# Patient Record
Sex: FEMALE | Race: BLACK OR AFRICAN AMERICAN | URBAN - METROPOLITAN AREA
[De-identification: names, ages, dates, MRNs, and addresses within clinical notes are randomized per-mention and may not be internally consistent; named-entity substitution may affect disease eponyms.]

---

## 2021-09-21 ENCOUNTER — LAB OUTSIDE AN ENCOUNTER (OUTPATIENT)
Dept: URBAN - METROPOLITAN AREA CLINIC 105 | Facility: CLINIC | Age: 46
End: 2021-09-21

## 2021-09-21 ENCOUNTER — WEB ENCOUNTER (OUTPATIENT)
Dept: URBAN - METROPOLITAN AREA CLINIC 17 | Facility: CLINIC | Age: 46
End: 2021-09-21

## 2021-09-21 ENCOUNTER — OFFICE VISIT (OUTPATIENT)
Dept: URBAN - METROPOLITAN AREA CLINIC 17 | Facility: CLINIC | Age: 46
End: 2021-09-21
Payer: COMMERCIAL

## 2021-09-21 DIAGNOSIS — R19.4 CHANGE IN BOWEL HABITS: ICD-10-CM

## 2021-09-21 DIAGNOSIS — Z87.19 HISTORY OF DIVERTICULITIS: ICD-10-CM

## 2021-09-21 DIAGNOSIS — I10 ESSENTIAL HYPERTENSION: ICD-10-CM

## 2021-09-21 DIAGNOSIS — E66.01 MORBID OBESITY: ICD-10-CM

## 2021-09-21 DIAGNOSIS — R10.32 LLQ PAIN: ICD-10-CM

## 2021-09-21 DIAGNOSIS — E13.9 DIABETES 1.5, MANAGED AS TYPE 2: ICD-10-CM

## 2021-09-21 DIAGNOSIS — Z90.49 HISTORY OF RESECTION OF LIVER: ICD-10-CM

## 2021-09-21 DIAGNOSIS — R74.8 ELEVATED LIVER ENZYMES: ICD-10-CM

## 2021-09-21 PROCEDURE — 99204 OFFICE O/P NEW MOD 45 MIN: CPT | Performed by: INTERNAL MEDICINE

## 2021-09-21 RX ORDER — LOSARTAN POTASSIUM 100 MG/1
TABLET, FILM COATED ORAL
Qty: 90 | Status: ACTIVE | COMMUNITY

## 2021-09-21 RX ORDER — SALICYLIC ACID 3 G/100G
1 TABLET LOTION TOPICAL ONCE A DAY
Status: ACTIVE | COMMUNITY

## 2021-09-21 RX ORDER — CARVEDILOL 6.25 MG/1
TABLET, FILM COATED ORAL
Qty: 90 | Status: ACTIVE | COMMUNITY

## 2021-09-21 RX ORDER — EMPAGLIFLOZIN, METFORMIN HYDROCHLORIDE 10; 1000 MG/1; MG/1
TABLET, EXTENDED RELEASE ORAL
Qty: 90 | Status: ACTIVE | COMMUNITY

## 2021-09-21 RX ORDER — SPIRONOLACTONE 50 MG/1
TABLET ORAL
Qty: 90 | Status: ACTIVE | COMMUNITY

## 2021-09-21 RX ORDER — MELOXICAM 7.5 MG/1
TABLET ORAL
Qty: 90 | Status: ACTIVE | COMMUNITY

## 2021-09-21 NOTE — HPI-TODAY'S VISIT:
9/21/21 45 yo pt of Dr Rodriguez who is referred for diverticulitis. She was seen at [facility] about 2 weeks ago for generalized abd pain and tenderness on LLQ.  She had labs and a CT (no records at this time) and told she had diverticulitis. She was given abx and a probiotic. Augmentin. She has completed abx therapy.  She says it helped her feel better and the bloating has resolved.  She was also given a pain med which she took, oxycodone, last taken about a week ago. Says pain recurred about 2 days ago, a full ache centered in her LLQ with intermittent sharp exacerbations. She is afraid that she is "flaring up again".  She was on a liquid diet until  about 11 days ago because she was feeling better she has liberalized her diet.  she has no fever or chills.  Says her BM are changing, she is feeling more constipated than she had been in the past.  On abx her stools were liquid but today formed and difficult to evacuate.  No blood in stool. no fhx of colon CA.

## 2021-09-21 NOTE — EXAM-PHYSICAL EXAM
Gen: Alert, oriented, in no distress Heent: no icterus, lips wnl Lungs: bilateral breath sounds CVS: first and second heart sounds Abdomen: full, soft, dense midline scar extending to R of abdomen and periumbilical scar. benign Ext: no edema Joints: normal joints and symmetry Spine: consistent with age Skin: no rash on exposed areas Neuro: no focal localizing signs

## 2021-09-23 ENCOUNTER — WEB ENCOUNTER (OUTPATIENT)
Dept: URBAN - METROPOLITAN AREA CLINIC 17 | Facility: CLINIC | Age: 46
End: 2021-09-23

## 2021-09-24 ENCOUNTER — WEB ENCOUNTER (OUTPATIENT)
Dept: URBAN - METROPOLITAN AREA CLINIC 17 | Facility: CLINIC | Age: 46
End: 2021-09-24

## 2021-09-24 ENCOUNTER — TELEPHONE ENCOUNTER (OUTPATIENT)
Dept: URBAN - METROPOLITAN AREA CLINIC 92 | Facility: CLINIC | Age: 46
End: 2021-09-24

## 2021-09-24 LAB
ACTIN (SMOOTH MUSCLE) ANTIBODY: 15
ALPHA-1-ANTITRYPSIN, SERUM: 119
BASO (ABSOLUTE): 0.1
BASOS: 1
CERULOPLASMIN: 25.2
EOS (ABSOLUTE): 0.1
EOS: 1
FERRITIN, SERUM: 264
HBSAG SCREEN: NEGATIVE
HEMATOCRIT: 46.7
HEMATOLOGY COMMENTS:: (no result)
HEMOGLOBIN: 15.1
HEP A AB, IGM: NEGATIVE
HEP B CORE AB, IGM: NEGATIVE
HEP C VIRUS AB: 0.1
IMMATURE CELLS: (no result)
IMMATURE GRANS (ABS): 0
IMMATURE GRANULOCYTES: 0
IRON BIND.CAP.(TIBC): 304
IRON SATURATION: 50
IRON: 153
LYMPHS (ABSOLUTE): 2
LYMPHS: 39
MCH: 28.8
MCHC: 32.3
MCV: 89
MITOCHONDRIAL (M2) ANTIBODY: <20
MONOCYTES(ABSOLUTE): 0.4
MONOCYTES: 8
NEUTROPHILS (ABSOLUTE): 2.5
NEUTROPHILS: 51
NRBC: (no result)
PLATELETS: 357
RBC: 5.24
RDW: 13.5
UIBC: 151
WBC: 5

## 2021-09-24 RX ORDER — CIPROFLOXACIN HYDROCHLORIDE 500 MG/1
1 TABLET TABLET, FILM COATED ORAL
Qty: 28 TABLET | Refills: 0 | OUTPATIENT
Start: 2021-09-24 | End: 2021-10-08

## 2021-09-24 RX ORDER — METRONIDAZOLE 500 MG/1
1 TABLET TABLET, FILM COATED ORAL
Qty: 42 TABLET | Refills: 0 | OUTPATIENT
Start: 2021-09-24 | End: 2021-10-08

## 2021-10-06 ENCOUNTER — TELEPHONE ENCOUNTER (OUTPATIENT)
Dept: URBAN - METROPOLITAN AREA CLINIC 17 | Facility: CLINIC | Age: 46
End: 2021-10-06

## 2021-10-06 ENCOUNTER — LAB OUTSIDE AN ENCOUNTER (OUTPATIENT)
Dept: URBAN - METROPOLITAN AREA CLINIC 17 | Facility: CLINIC | Age: 46
End: 2021-10-06

## 2021-10-07 ENCOUNTER — OFFICE VISIT (OUTPATIENT)
Dept: URBAN - METROPOLITAN AREA CLINIC 16 | Facility: CLINIC | Age: 46
End: 2021-10-07
Payer: COMMERCIAL

## 2021-10-07 ENCOUNTER — OFFICE VISIT (OUTPATIENT)
Dept: URBAN - METROPOLITAN AREA CLINIC 17 | Facility: CLINIC | Age: 46
End: 2021-10-07
Payer: COMMERCIAL

## 2021-10-07 VITALS
TEMPERATURE: 97.3 F | DIASTOLIC BLOOD PRESSURE: 78 MMHG | WEIGHT: 269.6 LBS | BODY MASS INDEX: 40.86 KG/M2 | SYSTOLIC BLOOD PRESSURE: 110 MMHG | HEART RATE: 80 BPM | HEIGHT: 68 IN

## 2021-10-07 DIAGNOSIS — R19.4 CHANGE IN BOWEL HABITS: ICD-10-CM

## 2021-10-07 DIAGNOSIS — K76.0 FATTY LIVER: ICD-10-CM

## 2021-10-07 DIAGNOSIS — R10.32 LLQ PAIN: ICD-10-CM

## 2021-10-07 DIAGNOSIS — R74.8 ELEVATED LIVER ENZYMES: ICD-10-CM

## 2021-10-07 DIAGNOSIS — Z87.19 HISTORY OF DIVERTICULITIS: ICD-10-CM

## 2021-10-07 DIAGNOSIS — R79.89 ELEVATED FERRITIN LEVEL: ICD-10-CM

## 2021-10-07 DIAGNOSIS — Z90.49 HISTORY OF RESECTION OF LIVER: ICD-10-CM

## 2021-10-07 DIAGNOSIS — D27.9 DERMOID CYST OF OVARY, UNSPECIFIED LATERALITY: ICD-10-CM

## 2021-10-07 DIAGNOSIS — I10 ESSENTIAL HYPERTENSION: ICD-10-CM

## 2021-10-07 DIAGNOSIS — E66.01 MORBID OBESITY: ICD-10-CM

## 2021-10-07 DIAGNOSIS — E13.9 DIABETES 1.5, MANAGED AS TYPE 2: ICD-10-CM

## 2021-10-07 DIAGNOSIS — K76.0 FATTY (CHANGE OF) LIVER: ICD-10-CM

## 2021-10-07 PROCEDURE — 76705 ECHO EXAM OF ABDOMEN: CPT | Performed by: INTERNAL MEDICINE

## 2021-10-07 PROCEDURE — 99214 OFFICE O/P EST MOD 30 MIN: CPT | Performed by: INTERNAL MEDICINE

## 2021-10-07 RX ORDER — METRONIDAZOLE 500 MG/1
1 TABLET TABLET, FILM COATED ORAL
Qty: 42 TABLET | Refills: 0 | Status: ACTIVE | COMMUNITY
Start: 2021-09-24 | End: 2021-10-08

## 2021-10-07 RX ORDER — LOSARTAN POTASSIUM 100 MG/1
TABLET, FILM COATED ORAL
Qty: 90 | Status: ACTIVE | COMMUNITY

## 2021-10-07 RX ORDER — MELOXICAM 7.5 MG/1
TABLET ORAL
Qty: 90 | Status: ACTIVE | COMMUNITY

## 2021-10-07 RX ORDER — CARVEDILOL 6.25 MG/1
TABLET, FILM COATED ORAL
Qty: 90 | Status: ACTIVE | COMMUNITY

## 2021-10-07 RX ORDER — SALICYLIC ACID 3 G/100G
1 TABLET LOTION TOPICAL ONCE A DAY
Status: ACTIVE | COMMUNITY

## 2021-10-07 RX ORDER — CIPROFLOXACIN HYDROCHLORIDE 500 MG/1
1 TABLET TABLET, FILM COATED ORAL
Qty: 28 TABLET | Refills: 0 | Status: ACTIVE | COMMUNITY
Start: 2021-09-24 | End: 2021-10-08

## 2021-10-07 RX ORDER — SPIRONOLACTONE 50 MG/1
TABLET ORAL
Qty: 90 | Status: ACTIVE | COMMUNITY

## 2021-10-07 RX ORDER — EMPAGLIFLOZIN, METFORMIN HYDROCHLORIDE 10; 1000 MG/1; MG/1
TABLET, EXTENDED RELEASE ORAL
Qty: 90 | Status: ACTIVE | COMMUNITY

## 2021-10-07 NOTE — HPI-TODAY'S VISIT:
9/21/21 47 yo pt of Dr Rodriguez who is referred for diverticulitis. She was seen at [facility] about 2 weeks ago for generalized abd pain and tenderness on LLQ.  She had labs and a CT (no records at this time) and told she had diverticulitis. She was given abx and a probiotic. Augmentin. She has completed abx therapy.  She says it helped her feel better and the bloating has resolved.  She was also given a pain med which she took, oxycodone, last taken about a week ago. Says pain recurred about 2 days ago, a full ache centered in her LLQ with intermittent sharp exacerbations. She is afraid that she is "flaring up again".  She was on a liquid diet until  about 11 days ago because she was feeling better she has liberalized her diet.  she has no fever or chills.  Says her BM are changing, she is feeling more constipated than she had been in the past.  On abx her stools were liquid but today formed and difficult to evacuate.  No blood in stool. no fhx of colon CA.   10/7/21 Here for f.u Taking abx, says she feels better. PCP called in nystatin for oral thrush. Feels fullness in LLQ but its not painful 'this is a day I will prob have liquids because I am afraid it will make me hurt". She was on a liquid diet for one week since I last saw her. While on a liquid diet, she felt no pain until 3 days  in, She called on 9/24 and I sent cipro/flaglakshmi. Pain resolved. She has one more day of abx, She is taking miralax everyday and having soft/runny stools daily because of this.  Has no pain but feels a fullness in her LLQ. no fevers or chills. It is constant.  One week after liquid diet, she started to eat a low fiber diet.

## 2021-10-13 LAB — HEREDITARY  HEMOCHROMATOSIS: (no result)

## 2021-11-04 ENCOUNTER — LAB OUTSIDE AN ENCOUNTER (OUTPATIENT)
Dept: URBAN - METROPOLITAN AREA CLINIC 17 | Facility: CLINIC | Age: 46
End: 2021-11-04

## 2021-11-04 ENCOUNTER — OFFICE VISIT (OUTPATIENT)
Dept: URBAN - METROPOLITAN AREA CLINIC 17 | Facility: CLINIC | Age: 46
End: 2021-11-04
Payer: COMMERCIAL

## 2021-11-04 VITALS
WEIGHT: 262 LBS | HEIGHT: 68 IN | DIASTOLIC BLOOD PRESSURE: 75 MMHG | HEART RATE: 80 BPM | BODY MASS INDEX: 39.71 KG/M2 | SYSTOLIC BLOOD PRESSURE: 113 MMHG | TEMPERATURE: 97.3 F

## 2021-11-04 DIAGNOSIS — R19.4 CHANGE IN BOWEL HABITS: ICD-10-CM

## 2021-11-04 DIAGNOSIS — Z87.19 HISTORY OF DIVERTICULITIS: ICD-10-CM

## 2021-11-04 DIAGNOSIS — R79.89 ELEVATED FERRITIN LEVEL: ICD-10-CM

## 2021-11-04 DIAGNOSIS — E13.9 DIABETES 1.5, MANAGED AS TYPE 2: ICD-10-CM

## 2021-11-04 DIAGNOSIS — R74.8 ELEVATED LIVER ENZYMES: ICD-10-CM

## 2021-11-04 DIAGNOSIS — R10.32 LLQ PAIN: ICD-10-CM

## 2021-11-04 DIAGNOSIS — Z90.49 HISTORY OF RESECTION OF LIVER: ICD-10-CM

## 2021-11-04 DIAGNOSIS — E66.01 MORBID OBESITY: ICD-10-CM

## 2021-11-04 DIAGNOSIS — K76.0 FATTY LIVER: ICD-10-CM

## 2021-11-04 DIAGNOSIS — D27.9 DERMOID CYST OF OVARY, UNSPECIFIED LATERALITY: ICD-10-CM

## 2021-11-04 DIAGNOSIS — I10 ESSENTIAL HYPERTENSION: ICD-10-CM

## 2021-11-04 PROCEDURE — 99214 OFFICE O/P EST MOD 30 MIN: CPT | Performed by: INTERNAL MEDICINE

## 2021-11-04 RX ORDER — SPIRONOLACTONE 50 MG/1
TABLET ORAL
Qty: 90 | Status: ACTIVE | COMMUNITY

## 2021-11-04 RX ORDER — SALICYLIC ACID 3 G/100G
1 TABLET LOTION TOPICAL ONCE A DAY
Status: ACTIVE | COMMUNITY

## 2021-11-04 RX ORDER — EMPAGLIFLOZIN, METFORMIN HYDROCHLORIDE 10; 1000 MG/1; MG/1
TABLET, EXTENDED RELEASE ORAL
Qty: 90 | Status: ACTIVE | COMMUNITY

## 2021-11-04 RX ORDER — MELOXICAM 7.5 MG/1
TABLET ORAL
Qty: 90 | Status: DISCONTINUED | COMMUNITY

## 2021-11-04 RX ORDER — CARVEDILOL 6.25 MG/1
TABLET, FILM COATED ORAL
Qty: 90 | Status: ACTIVE | COMMUNITY

## 2021-11-04 RX ORDER — LOSARTAN POTASSIUM 100 MG/1
TABLET, FILM COATED ORAL
Qty: 90 | Status: ACTIVE | COMMUNITY

## 2021-11-04 NOTE — HPI-TODAY'S VISIT:
9/21/21 45 yo pt of Dr Rodriguez who is referred for diverticulitis. She was seen at [facility] about 2 weeks ago for generalized abd pain and tenderness on LLQ.  She had labs and a CT (no records at this time) and told she had diverticulitis. She was given abx and a probiotic. Augmentin. She has completed abx therapy.  She says it helped her feel better and the bloating has resolved.  She was also given a pain med which she took, oxycodone, last taken about a week ago. Says pain recurred about 2 days ago, a full ache centered in her LLQ with intermittent sharp exacerbations. She is afraid that she is "flaring up again".  She was on a liquid diet until  about 11 days ago because she was feeling better she has liberalized her diet.  she has no fever or chills.  Says her BM are changing, she is feeling more constipated than she had been in the past.  On abx her stools were liquid but today formed and difficult to evacuate.  No blood in stool. no fhx of colon CA.   10/7/21 Here for f.u Taking abx, says she feels better. PCP called in nystatin for oral thrush. Feels fullness in LLQ but its not painful 'this is a day I will prob have liquids because I am afraid it will make me hurt". She was on a liquid diet for one week since I last saw her. While on a liquid diet, she felt no pain until 3 days  in, She called on 9/24 and I sent cipro/natalie. Pain resolved. She has one more day of abx, She is taking miralax everyday and having soft/runny stools daily because of this.  Has no pain but feels a fullness in her LLQ. no fevers or chills. It is constant.  One week after liquid diet, she started to eat a low fiber diet.   11/4/21 here for f.u visit Says she has not had pain when she eats fish, white bread, takes miralax qod and lots of liquids.  When she eats a regular diet with fried chicken, mashed potatoes and peas, she still has LLQ pain. This was 10 days ago. When she went back to liquids this resolved. She is still mostly on liquid diet.  Her documented episode of diverticulitis was 2 months ago. She has a BM most days of the week. She does not feel constipated. Stools are different - went from fluffy and full to liquids, now long and thin.

## 2021-11-12 ENCOUNTER — LAB OUTSIDE AN ENCOUNTER (OUTPATIENT)
Dept: URBAN - METROPOLITAN AREA CLINIC 92 | Facility: CLINIC | Age: 46
End: 2021-11-12

## 2021-11-12 ENCOUNTER — TELEPHONE ENCOUNTER (OUTPATIENT)
Dept: URBAN - METROPOLITAN AREA CLINIC 92 | Facility: CLINIC | Age: 46
End: 2021-11-12

## 2021-11-12 PROBLEM — 307496006: Status: ACTIVE | Noted: 2021-11-12

## 2021-11-18 ENCOUNTER — TELEPHONE ENCOUNTER (OUTPATIENT)
Dept: URBAN - METROPOLITAN AREA CLINIC 17 | Facility: CLINIC | Age: 46
End: 2021-11-18

## 2021-11-19 ENCOUNTER — OFFICE VISIT (OUTPATIENT)
Dept: URBAN - METROPOLITAN AREA SURGERY CENTER 16 | Facility: SURGERY CENTER | Age: 46
End: 2021-11-19
Payer: COMMERCIAL

## 2021-11-19 DIAGNOSIS — K57.30 ACQUIRED DIVERTICULOSIS OF COLON: ICD-10-CM

## 2021-11-19 DIAGNOSIS — K57.32 DIVERTICULITIS LARGE INTESTINE: ICD-10-CM

## 2021-11-19 PROCEDURE — 45378 DIAGNOSTIC COLONOSCOPY: CPT | Performed by: INTERNAL MEDICINE

## 2021-11-19 PROCEDURE — G8907 PT DOC NO EVENTS ON DISCHARG: HCPCS | Performed by: INTERNAL MEDICINE

## 2021-11-19 RX ORDER — LOSARTAN POTASSIUM 100 MG/1
TABLET, FILM COATED ORAL
Qty: 90 | Status: ACTIVE | COMMUNITY

## 2021-11-19 RX ORDER — SALICYLIC ACID 3 G/100G
1 TABLET LOTION TOPICAL ONCE A DAY
Status: ACTIVE | COMMUNITY

## 2021-11-19 RX ORDER — CARVEDILOL 6.25 MG/1
TABLET, FILM COATED ORAL
Qty: 90 | Status: ACTIVE | COMMUNITY

## 2021-11-19 RX ORDER — EMPAGLIFLOZIN, METFORMIN HYDROCHLORIDE 10; 1000 MG/1; MG/1
TABLET, EXTENDED RELEASE ORAL
Qty: 90 | Status: ACTIVE | COMMUNITY

## 2021-11-19 RX ORDER — SPIRONOLACTONE 50 MG/1
TABLET ORAL
Qty: 90 | Status: ACTIVE | COMMUNITY

## 2022-01-13 ENCOUNTER — OFFICE VISIT (OUTPATIENT)
Dept: URBAN - METROPOLITAN AREA CLINIC 17 | Facility: CLINIC | Age: 47
End: 2022-01-13

## 2022-02-23 ENCOUNTER — LAB OUTSIDE AN ENCOUNTER (OUTPATIENT)
Dept: URBAN - METROPOLITAN AREA CLINIC 105 | Facility: CLINIC | Age: 47
End: 2022-02-23

## 2022-02-23 ENCOUNTER — DASHBOARD ENCOUNTERS (OUTPATIENT)
Age: 47
End: 2022-02-23

## 2022-02-23 ENCOUNTER — OFFICE VISIT (OUTPATIENT)
Dept: URBAN - METROPOLITAN AREA CLINIC 105 | Facility: CLINIC | Age: 47
End: 2022-02-23
Payer: COMMERCIAL

## 2022-02-23 DIAGNOSIS — E13.9 DIABETES 1.5, MANAGED AS TYPE 2: ICD-10-CM

## 2022-02-23 DIAGNOSIS — R74.8 ELEVATED LIVER ENZYMES: ICD-10-CM

## 2022-02-23 DIAGNOSIS — R10.32 LLQ PAIN: ICD-10-CM

## 2022-02-23 DIAGNOSIS — D27.9 DERMOID CYST OF OVARY, UNSPECIFIED LATERALITY: ICD-10-CM

## 2022-02-23 DIAGNOSIS — R79.89 ELEVATED FERRITIN LEVEL: ICD-10-CM

## 2022-02-23 DIAGNOSIS — R19.4 CHANGE IN BOWEL HABITS: ICD-10-CM

## 2022-02-23 DIAGNOSIS — E66.01 MORBID OBESITY: ICD-10-CM

## 2022-02-23 DIAGNOSIS — I10 ESSENTIAL HYPERTENSION: ICD-10-CM

## 2022-02-23 DIAGNOSIS — Z87.19 HISTORY OF DIVERTICULITIS: ICD-10-CM

## 2022-02-23 DIAGNOSIS — Z90.49 HISTORY OF RESECTION OF LIVER: ICD-10-CM

## 2022-02-23 DIAGNOSIS — K76.0 FATTY LIVER: ICD-10-CM

## 2022-02-23 PROBLEM — 59621000: Status: ACTIVE | Noted: 2021-09-21

## 2022-02-23 PROBLEM — 238136002: Status: ACTIVE | Noted: 2021-09-21

## 2022-02-23 PROBLEM — 197321007: Status: ACTIVE | Noted: 2021-10-07

## 2022-02-23 PROBLEM — 429281008: Status: ACTIVE | Noted: 2021-09-21

## 2022-02-23 PROBLEM — 426875007: Status: ACTIVE | Noted: 2021-09-21

## 2022-02-23 PROCEDURE — 99214 OFFICE O/P EST MOD 30 MIN: CPT | Performed by: INTERNAL MEDICINE

## 2022-02-23 RX ORDER — LOSARTAN POTASSIUM 100 MG/1
TABLET, FILM COATED ORAL
Qty: 90 | Status: ACTIVE | COMMUNITY

## 2022-02-23 RX ORDER — SPIRONOLACTONE 50 MG/1
TABLET ORAL
Qty: 90 | Status: ACTIVE | COMMUNITY

## 2022-02-23 RX ORDER — LORATADINE 10 MG/1
1 TABLET TABLET ORAL ONCE A DAY
Status: ACTIVE | COMMUNITY

## 2022-02-23 RX ORDER — CARVEDILOL 6.25 MG/1
TABLET, FILM COATED ORAL
Qty: 90 | Status: ACTIVE | COMMUNITY

## 2022-02-23 RX ORDER — EMPAGLIFLOZIN, METFORMIN HYDROCHLORIDE 10; 1000 MG/1; MG/1
TABLET, EXTENDED RELEASE ORAL
Qty: 90 | Status: ON HOLD | COMMUNITY

## 2022-02-23 RX ORDER — POLYETHYLENE GLYCOL-3350, SODIUM CHLORIDE, POTASSIUM CHLORIDE AND SODIUM BICARBONATE 420; 11.2; 5.72; 1.48 G/438.4G; G/438.4G; G/438.4G; G/438.4G
AS DIRECTED POWDER, FOR SOLUTION ORAL ONCE
Qty: 1 KIT | Refills: 0 | OUTPATIENT
Start: 2022-02-23 | End: 2022-02-24

## 2022-02-23 RX ORDER — SALICYLIC ACID 3 G/100G
1 TABLET LOTION TOPICAL ONCE A DAY
Status: ON HOLD | COMMUNITY

## 2022-02-23 NOTE — HPI-TODAY'S VISIT:
9/21/21 47 yo pt of Dr Rodriguez who is referred for diverticulitis. She was seen at [facility] about 2 weeks ago for generalized abd pain and tenderness on LLQ.  She had labs and a CT (no records at this time) and told she had diverticulitis. She was given abx and a probiotic. Augmentin. She has completed abx therapy.  She says it helped her feel better and the bloating has resolved.  She was also given a pain med which she took, oxycodone, last taken about a week ago. Says pain recurred about 2 days ago, a full ache centered in her LLQ with intermittent sharp exacerbations. She is afraid that she is "flaring up again".  She was on a liquid diet until  about 11 days ago because she was feeling better she has liberalized her diet.  she has no fever or chills.  Says her BM are changing, she is feeling more constipated than she had been in the past.  On abx her stools were liquid but today formed and difficult to evacuate.  No blood in stool. no fhx of colon CA.   10/7/21 Here for f.u Taking abx, says she feels better. PCP called in nystatin for oral thrush. Feels fullness in LLQ but its not painful 'this is a day I will prob have liquids because I am afraid it will make me hurt". She was on a liquid diet for one week since I last saw her. While on a liquid diet, she felt no pain until 3 days  in, She called on 9/24 and I sent cipro/natalie. Pain resolved. She has one more day of abx, She is taking miralax everyday and having soft/runny stools daily because of this.  Has no pain but feels a fullness in her LLQ. no fevers or chills. It is constant.  One week after liquid diet, she started to eat a low fiber diet.   11/4/21 here for f.u visit Says she has not had pain when she eats fish, white bread, takes miralax qod and lots of liquids.  When she eats a regular diet with fried chicken, mashed potatoes and peas, she still has LLQ pain. This was 10 days ago. When she went back to liquids this resolved. She is still mostly on liquid diet.  Her documented episode of diverticulitis was 2 months ago. She has a BM most days of the week. She does not feel constipated. Stools are different - went from fluffy and full to liquids, now long and thin.   2/23/22 Doing well Had covid earlier this month. Also had a kidney stone (at first she had thought was a diverticulitis flare up) Eating a regular diet "I do eat a lot of soup now" Takes miralax about once every 2 weeks "when I feel a little sluggish". Has also increased her water intake.  Discussed colonoscopy results.  She has a BM most days of the week with mostly  completely evacuation.  She does not know her fiber intake. Drinks about 60 ounces of water a day.

## 2022-02-25 ENCOUNTER — CLAIMS CREATED FROM THE CLAIM WINDOW (OUTPATIENT)
Dept: URBAN - METROPOLITAN AREA SURGERY CENTER 16 | Facility: SURGERY CENTER | Age: 47
End: 2022-02-25

## 2022-02-25 ENCOUNTER — CLAIMS CREATED FROM THE CLAIM WINDOW (OUTPATIENT)
Dept: URBAN - METROPOLITAN AREA SURGERY CENTER 16 | Facility: SURGERY CENTER | Age: 47
End: 2022-02-25
Payer: COMMERCIAL

## 2022-02-25 ENCOUNTER — CLAIMS CREATED FROM THE CLAIM WINDOW (OUTPATIENT)
Dept: URBAN - METROPOLITAN AREA CLINIC 4 | Facility: CLINIC | Age: 47
End: 2022-02-25
Payer: COMMERCIAL

## 2022-02-25 DIAGNOSIS — D12.3 BENIGN NEOPLASM OF TRANSVERSE COLON: ICD-10-CM

## 2022-02-25 DIAGNOSIS — D12.3 ADENOMA OF TRANSVERSE COLON: ICD-10-CM

## 2022-02-25 DIAGNOSIS — K63.3 APHTHOUS ULCER OF COLON: ICD-10-CM

## 2022-02-25 DIAGNOSIS — K57.30 ACQUIRED DIVERTICULOSIS OF COLON: ICD-10-CM

## 2022-02-25 PROCEDURE — 45385 COLONOSCOPY W/LESION REMOVAL: CPT | Performed by: INTERNAL MEDICINE

## 2022-02-25 PROCEDURE — G8907 PT DOC NO EVENTS ON DISCHARG: HCPCS | Performed by: INTERNAL MEDICINE

## 2022-02-25 PROCEDURE — 45381 COLONOSCOPY SUBMUCOUS NJX: CPT | Performed by: INTERNAL MEDICINE

## 2022-02-25 PROCEDURE — 88305 TISSUE EXAM BY PATHOLOGIST: CPT | Performed by: PATHOLOGY

## 2022-02-25 RX ORDER — LOSARTAN POTASSIUM 100 MG/1
TABLET, FILM COATED ORAL
Qty: 90 | Status: ACTIVE | COMMUNITY

## 2022-02-25 RX ORDER — LORATADINE 10 MG/1
1 TABLET TABLET ORAL ONCE A DAY
Status: ACTIVE | COMMUNITY

## 2022-02-25 RX ORDER — EMPAGLIFLOZIN, METFORMIN HYDROCHLORIDE 10; 1000 MG/1; MG/1
TABLET, EXTENDED RELEASE ORAL
Qty: 90 | Status: ON HOLD | COMMUNITY

## 2022-02-25 RX ORDER — SPIRONOLACTONE 50 MG/1
TABLET ORAL
Qty: 90 | Status: ACTIVE | COMMUNITY

## 2022-02-25 RX ORDER — CARVEDILOL 6.25 MG/1
TABLET, FILM COATED ORAL
Qty: 90 | Status: ACTIVE | COMMUNITY

## 2022-02-25 RX ORDER — SALICYLIC ACID 3 G/100G
1 TABLET LOTION TOPICAL ONCE A DAY
Status: ON HOLD | COMMUNITY